# Patient Record
Sex: FEMALE | Race: BLACK OR AFRICAN AMERICAN | Employment: FULL TIME | URBAN - METROPOLITAN AREA
[De-identification: names, ages, dates, MRNs, and addresses within clinical notes are randomized per-mention and may not be internally consistent; named-entity substitution may affect disease eponyms.]

---

## 2020-06-24 ENCOUNTER — OFFICE VISIT (OUTPATIENT)
Dept: PRIMARY CARE CLINIC | Age: 40
End: 2020-06-24

## 2020-06-24 PROCEDURE — 99211 OFF/OP EST MAY X REQ PHY/QHP: CPT | Performed by: INTERNAL MEDICINE

## 2020-06-24 NOTE — PATIENT INSTRUCTIONS
have a medical emergency and need to call 911, notify the dispatch personnel that you have, or are being evaluated for COVID-19. If possible, put on a facemask before emergency medical services arrive. Discontinuing home isolation  Patients with confirmed COVID-19 should remain under home isolation precautions until the risk of secondary transmission to others is thought to be low. The decision to discontinue home isolation precautions should be made on a case-by-case basis, in consultation with healthcare providers and state and local health departments. Thank you for enrolling in [facility]. Please follow the instructions below to securely access your online medical record. [facility] allows you to send messages to your doctor, view your test results, renew your prescriptions, schedule appointments, and more. How Do I Sign Up? 1. In your Internet browser, go to [email or web address]  2. Click on the Sign Up Now link in the Sign In box. You will see the New Member Sign Up page. 3. Enter your [facility] Access Code exactly as it appears below. You will not need to use this code after youve completed the sign-up process. If you do not sign up before the expiration date, you must request a new code. [facility] Access Code: [email or web address]  Expires: 8/8/2020  3:06 PM    4. Enter your Social Security Number ([id number]) and Date of Birth (mm/dd/yyyy) as indicated and click Submit. You will be taken to the next sign-up page. 5. Create a [facility] ID. This will be your [facility] login ID and cannot be changed, so think of one that is secure and easy to remember. 6. Create a [facility] password. You can change your password at any time. 7. Enter your Password Reset Question and Answer. This can be used at a later time if you forget your password. 8. Enter your e-mail address. You will receive e-mail notification when new information is available in [facility]. 9. Click Sign Up.  You can now view your medical record. Additional Information  If you have questions, please contact your physician practice where you receive care. Remember, MyChart is NOT to be used for urgent needs. For medical emergencies, dial 911.

## 2020-06-27 LAB
SARS-COV-2: NOT DETECTED
SOURCE: NORMAL

## 2020-07-10 ENCOUNTER — OFFICE VISIT (OUTPATIENT)
Dept: PRIMARY CARE CLINIC | Age: 40
End: 2020-07-10

## 2020-07-10 PROCEDURE — 99211 OFF/OP EST MAY X REQ PHY/QHP: CPT | Performed by: FAMILY MEDICINE

## 2020-07-10 NOTE — PATIENT INSTRUCTIONS
You have received a viral test for COVID-19. Below is education on quarantine per the CDC guidelines. For any symptoms, seek care from your PCP, call [phone] to establish care with a doctor, or go directly to an urgent care or the emergency room. Test results will take 2-7 days and will be sent to you in your [employer] account. If you test positive, you will be contacted via phone. If you test negative, the ONLY communication will be through [address] GO TO [employer] AND SIGN UP FOR [facility]  (LOWER LEFT OF THE HOME PAGE)  No test is 100%. If you have symptoms, you should follow the guidance of quarantine as previously stated. You can still be contagious if you have symptoms. Your [facility] will reach out to you if you have a positive result. They will provide you with a return to work date and note. If you were tested for a pre-op, then you should remain in quarantine until your procedure. How do I know if I need to be in quarantine? If you live in a community where COVID-19 is or might be spreading (currently, that is virtually everywhere in the [place])  Be alert for symptoms. Watch for fever, cough, shortness of breath, or other symptoms of COVID-19. Take your temperature if symptoms develop. Practice social distancing. Maintain 6 feet of distance from others and stay out of crowded places. Follow CDC guidance if symptoms develop. If you feel healthy but:  Recently had close contact with a person with COVID-19 you need to Quarantine:  Stay home until 14 days after your last exposure. Check your temperature twice a day and watch for symptoms of COVID-19. If possible, stay away from people who are at higher-risk for getting very sick from COVID-19.   Stay Home and Monitor Your Health if you:  Have been diagnosed with COVID-19, or  Are waiting for test results, or  Have cough, fever, or shortness of breath, or symptoms of COVID-19      When You Can be Around Others After You Had or Likely Had COVID-19     If you have or think you might have COVID-19, it is important to stay home and away from other people. Staying away from others helps stop the spread of COVID-19. If you have an emergency warning sign (including trouble breathing), get emergency medical care immediately. When you can be around others (end home isolation) depends on different factors for different situations. Find CDC's recommendations for your situation below. I think or know I had COVID-19, and I had symptoms  You can be with others after  3 days with no fever and  Respiratory symptoms have improved (e.g. cough, shortness of breath) and  10 days since symptoms first appeared  Depending on your healthcare provider's advice and availability of testing, you might get tested to see if you still have COVID-19. If you will be tested, you can be around others when you have no fever, respiratory symptoms have improved, and you receive two negative test results in a row, at least 24 hours apart. I tested positive for COVID-19 but had no symptoms  If you continue to have no symptoms, you can be with others after:  10 days have passed since test or 14 days since your exposure test   Depending on your healthcare provider's advice and availability of testing, you might get tested to see if you still have COVID-19. If you will be tested, you can be around others after you receive two negative test results in a row, at least 24 hours apart. If you develop symptoms after testing positive, follow the guidance above for I think or know I had COVID, and I had symptoms.   For Anyone Who Has Been Around a Person with COVID-19  It is important to remember that anyone who has close contact with someone with COVID-19 should stay home for 14 days after exposure based on the time it takes to develop illness. Testing is not necessary.     www.cdc.gov/coronavirus/2019-ncov/index.html

## 2020-07-15 LAB
SARS-COV-2: NOT DETECTED
SOURCE: NORMAL

## 2021-05-21 ENCOUNTER — APPOINTMENT (OUTPATIENT)
Dept: GENERAL RADIOLOGY | Age: 41
End: 2021-05-21
Payer: COMMERCIAL

## 2021-05-21 ENCOUNTER — HOSPITAL ENCOUNTER (EMERGENCY)
Age: 41
Discharge: HOME | End: 2021-05-22
Attending: EMERGENCY MEDICINE
Payer: COMMERCIAL

## 2021-05-21 VITALS
DIASTOLIC BLOOD PRESSURE: 88 MMHG | OXYGEN SATURATION: 97 % | HEART RATE: 73 BPM | TEMPERATURE: 98.2 F | SYSTOLIC BLOOD PRESSURE: 146 MMHG | RESPIRATION RATE: 21 BRPM

## 2021-05-21 DIAGNOSIS — R07.89 MUSCULOSKELETAL CHEST PAIN: Primary | ICD-10-CM

## 2021-05-21 LAB
ANION GAP SERPL CALCULATED.3IONS-SCNC: 10 MMOL/L (ref 3–16)
BASOPHILS ABSOLUTE: 0 K/UL (ref 0–0.2)
BASOPHILS RELATIVE PERCENT: 0.3 %
BUN BLDV-MCNC: 11 MG/DL (ref 7–20)
CALCIUM SERPL-MCNC: 9.1 MG/DL (ref 8.3–10.6)
CHLORIDE BLD-SCNC: 108 MMOL/L (ref 99–110)
CO2: 25 MMOL/L (ref 21–32)
CREAT SERPL-MCNC: 0.7 MG/DL (ref 0.6–1.1)
EOSINOPHILS ABSOLUTE: 0.1 K/UL (ref 0–0.6)
EOSINOPHILS RELATIVE PERCENT: 1.6 %
GFR AFRICAN AMERICAN: >60
GFR NON-AFRICAN AMERICAN: >60
GLUCOSE BLD-MCNC: 96 MG/DL (ref 70–99)
HCT VFR BLD CALC: 38.2 % (ref 36–48)
HEMOGLOBIN: 12.7 G/DL (ref 12–16)
LYMPHOCYTES ABSOLUTE: 1.5 K/UL (ref 1–5.1)
LYMPHOCYTES RELATIVE PERCENT: 18.7 %
MCH RBC QN AUTO: 28.2 PG (ref 26–34)
MCHC RBC AUTO-ENTMCNC: 33.3 G/DL (ref 31–36)
MCV RBC AUTO: 84.7 FL (ref 80–100)
MONOCYTES ABSOLUTE: 0.8 K/UL (ref 0–1.3)
MONOCYTES RELATIVE PERCENT: 9.3 %
NEUTROPHILS ABSOLUTE: 5.7 K/UL (ref 1.7–7.7)
NEUTROPHILS RELATIVE PERCENT: 70.1 %
PDW BLD-RTO: 14.8 % (ref 12.4–15.4)
PLATELET # BLD: 251 K/UL (ref 135–450)
PMV BLD AUTO: 8.1 FL (ref 5–10.5)
POTASSIUM REFLEX MAGNESIUM: 3.8 MMOL/L (ref 3.5–5.1)
RBC # BLD: 4.51 M/UL (ref 4–5.2)
SODIUM BLD-SCNC: 143 MMOL/L (ref 136–145)
TROPONIN: <0.01 NG/ML
WBC # BLD: 8.1 K/UL (ref 4–11)

## 2021-05-21 PROCEDURE — 93005 ELECTROCARDIOGRAM TRACING: CPT | Performed by: EMERGENCY MEDICINE

## 2021-05-21 PROCEDURE — 80048 BASIC METABOLIC PNL TOTAL CA: CPT

## 2021-05-21 PROCEDURE — 71046 X-RAY EXAM CHEST 2 VIEWS: CPT

## 2021-05-21 PROCEDURE — 84484 ASSAY OF TROPONIN QUANT: CPT

## 2021-05-21 PROCEDURE — 6370000000 HC RX 637 (ALT 250 FOR IP): Performed by: EMERGENCY MEDICINE

## 2021-05-21 PROCEDURE — 85025 COMPLETE CBC W/AUTO DIFF WBC: CPT

## 2021-05-21 PROCEDURE — 99284 EMERGENCY DEPT VISIT MOD MDM: CPT

## 2021-05-21 RX ORDER — CYCLOBENZAPRINE HCL 10 MG
10 TABLET ORAL 3 TIMES DAILY PRN
Qty: 15 TABLET | Refills: 0 | Status: SHIPPED | OUTPATIENT
Start: 2021-05-21 | End: 2021-05-26

## 2021-05-21 RX ORDER — CYCLOBENZAPRINE HCL 10 MG
10 TABLET ORAL 3 TIMES DAILY PRN
Qty: 15 TABLET | Refills: 0 | Status: SHIPPED | OUTPATIENT
Start: 2021-05-21 | End: 2021-05-21 | Stop reason: SDUPTHER

## 2021-05-21 RX ORDER — CYCLOBENZAPRINE HCL 10 MG
10 TABLET ORAL ONCE
Status: COMPLETED | OUTPATIENT
Start: 2021-05-21 | End: 2021-05-21

## 2021-05-21 RX ADMIN — CYCLOBENZAPRINE 10 MG: 10 TABLET, FILM COATED ORAL at 22:27

## 2021-05-21 ASSESSMENT — PAIN SCALES - WONG BAKER: WONGBAKER_NUMERICALRESPONSE: 8

## 2021-05-21 ASSESSMENT — PAIN DESCRIPTION - FREQUENCY: FREQUENCY: CONTINUOUS

## 2021-05-21 ASSESSMENT — PAIN DESCRIPTION - LOCATION: LOCATION: CHEST

## 2021-05-21 ASSESSMENT — HEART SCORE: ECG: 0

## 2021-05-21 ASSESSMENT — PAIN DESCRIPTION - ONSET: ONSET: ON-GOING

## 2021-05-21 ASSESSMENT — PAIN DESCRIPTION - PAIN TYPE: TYPE: ACUTE PAIN

## 2021-05-21 ASSESSMENT — PAIN SCALES - GENERAL: PAINLEVEL_OUTOF10: 7

## 2021-05-22 LAB
EKG ATRIAL RATE: 80 BPM
EKG DIAGNOSIS: NORMAL
EKG P AXIS: 49 DEGREES
EKG P-R INTERVAL: 110 MS
EKG Q-T INTERVAL: 364 MS
EKG QRS DURATION: 78 MS
EKG QTC CALCULATION (BAZETT): 419 MS
EKG R AXIS: 10 DEGREES
EKG T AXIS: 45 DEGREES
EKG VENTRICULAR RATE: 80 BPM

## 2021-05-22 PROCEDURE — 93010 ELECTROCARDIOGRAM REPORT: CPT | Performed by: INTERNAL MEDICINE

## 2021-05-22 NOTE — ED NOTES
Pt in restroo, unable to obtain ekg at this time     Glenys Hui [facility]  05/21/21 2022

## 2021-05-22 NOTE — ED PROVIDER NOTES
CHIEF COMPLAINT  Chest Pain (x 1 day)      HISTORY OF PRESENT ILLNESS  Juan David Kenney is a 45 y.o. female who  has no past medical history on file. presents to the ED complaining of chest pain is been present over the past day. Patient states the pain is a pulling aching sensation in her left lower chest and across her mid back. Patient states the pain comes on when she twists, bends or takes a deep breath. Patient denies any falls or trauma. Denies any new activities yesterday. No cough, shortness of breath or sputum production. No fevers or chills. No previous cardiac history. Denies any tobacco use. No recent travel, recent surgery, prolonged immobilization, unilateral leg pain or swelling, history of DVT/PE, oral hormone replacement. States she did take Tylenol earlier today for pain control with minimal relief. No abdominal pain, nausea or vomiting. No other complaints, modifying factors or associated symptoms. Nursing notes reviewed. History reviewed. No pertinent past medical history. History reviewed. No pertinent surgical history. History reviewed. No pertinent family history. Social History     Socioeconomic History    Marital status: Single     Spouse name: Not on file    Number of children: Not on file    Years of education: Not on file    Highest education level: Not on file   Occupational History    Not on file   Tobacco Use    Smoking status: Never Smoker   Substance and Sexual Activity    Alcohol use: No    Drug use: No    Sexual activity: Never   Other Topics Concern    Not on file   Social History Narrative    Not on file     Social Determinants of Health     Financial Resource Strain:     Difficulty of Paying Living Expenses:    Food Insecurity:     Worried About Running Out of Food in the Last Year:     [address] in the Last Year:    Transportation Needs:     Lack of Transportation (Medical):      Lack of Transportation (Non-Medical):    Physical Activity:     Days of Exercise per Week:     Minutes of Exercise per Session:    Stress:     Feeling of Stress :    Social Connections:     Frequency of Communication with Friends and Family:     Frequency of Social Gatherings with Friends and Family:     Attends [employer] Services:     Active Member of Clubs or Organizations:     Attends Club or Organization Meetings:     Marital Status:    Intimate Partner Violence:     Fear of Current or Ex-Partner:     Emotionally Abused:     Physically Abused:     Sexually Abused:      No current facility-administered medications for this encounter. Current Outpatient Medications   Medication Sig Dispense Refill    cyclobenzaprine (FLEXERIL) 10 MG tablet Take 1 tablet by mouth 3 times daily as needed for Muscle spasms 15 tablet 0     Allergies   Allergen Reactions    [place] [Aspirin Effervescent]     Nsaids     Sudafed [Pseudoephedrine Hcl]          REVIEW OF SYSTEMS  10 systems reviewed, pertinent positives per HPI otherwise noted to be negative    PHYSICAL EXAM  BP (!) 146/88   Pulse 73   Temp 98.2 °F (36.8 °C) (Oral)   Resp 21   LMP 05/21/2021   SpO2 97%      CONSTITUTIONAL: AOx4, cooperative with exam, afebrile   HEAD: normocephalic, atraumatic   EYES: PERRL, EOMI, anicteric sclera   ENT: Moist mucous membranes, uvula midline   BACK: Symmetric, no deformity, no midline tenderness of the cervical or thoracic or lumbar spine   LUNGS: Bilateral breath sounds, CTAB, no rales/ronchi/wheezes   CARDIOVASCULAR: RRR, normal S1/S2, no m/r/g, 2+ pulses throughout   ABDOMEN: Soft, non-tender, non-distended, +BS   NEUROLOGIC:  MAEx4, GCS 15   MUSCULOSKELETAL: No clubbing, cyanosis or edema of the extremities, tenderness along the left lower ribs as well as the parascapular muscles on the left mid back, no overlying skin changes   SKIN: No rash, pallor or wounds on exposed surfaces         RADIOLOGY  X-RAYS:  I have reviewed radiologic plain film image(s). ALL OTHER NON-PLAIN FILM IMAGES SUCH AS CT, ULTRASOUND AND MRI HAVE BEEN READ BY THE RADIOLOGIST. XR CHEST (2 VW)   Final Result   No acute abnormality. EKG INTERPRETATION  Normal sinus rhythm with a rate of 80, normal axis, , QRS 78, QTc 419, no ST elevations, nonspecific ST changes, underlying baseline artifact, no prior EKG on file    Heart Score    Heart Score for chest pain patients  History: Slightly Suspicious  ECG: Normal  Patient Age: < 45 years  *Risk factors for Atherosclerotic disease: Hypertension, Obesity  Risk Factors: 1 or 2 risk factors  Troponin: < 1X normal limit  Heart Score Total: 1    Score 0 - 3 = 2.5%  MACE over next 6 wks = Discharge home  Score 4 - 6 = 20.3%  MACE over next 6 wks = Obs admit  Score 7 - 10 = 72.7%  MACE over next 6 wks = Early invasive Rx      PROCEDURES    ED COURSE/MDM  Muscle strain, muscle spasm, costochondritis, URI, pleuritic chest pain  Patient seen and evaluated. History and physical as above. Nontoxic, afebrile. Patient with left-sided chest pain and back pain is reproduced with movement and palpation. No previous cardiac history. No other associated symptoms. Routine labs obtained and unremarkable. Chest x-ray negative. EKG normal sinus. Patient signs and symptoms more suggestive of muscle strain/spasm as she does have tenderness of the musculature in the parascapular region on the left as well as the lower ribs. No overlying skin changes. Patient PERC negative. ED Course as of May 21 2358   Fri May 21, 2021   2332 Patient's blood work unremarkable. Troponin negative. EKG normal sinus. Chest x-ray negative. Patient updated on results. Treated with Flexeril for suspected musculoskeletal pain as pain is reproducible to movement and palpation. Patient feeling better. Discussed discharge with short course of Flexeril and outpatient follow-up. Patient agreeable. Return instruction provided.   All questions answered prior to

## 2021-05-22 NOTE — ED NOTES
Discharge and education instructions reviewed. Patient verbalized understanding, teach-back successful. Patient denied questions at this time. No acute distress noted. Patient instructed to follow-up as noted - return to emergency department if symptoms worsen. Patient verbalized understanding. Discharged per EDMD with discharge instructions.         [address] [place] [address]  05/21/21 6048